# Patient Record
Sex: FEMALE
[De-identification: names, ages, dates, MRNs, and addresses within clinical notes are randomized per-mention and may not be internally consistent; named-entity substitution may affect disease eponyms.]

---

## 2024-02-28 ENCOUNTER — NON-APPOINTMENT (OUTPATIENT)
Age: 54
End: 2024-02-28

## 2024-02-29 ENCOUNTER — NON-APPOINTMENT (OUTPATIENT)
Age: 54
End: 2024-02-29

## 2024-02-29 PROBLEM — Z00.00 ENCOUNTER FOR PREVENTIVE HEALTH EXAMINATION: Status: ACTIVE | Noted: 2024-02-29

## 2024-03-07 ENCOUNTER — NON-APPOINTMENT (OUTPATIENT)
Age: 54
End: 2024-03-07

## 2024-09-30 ENCOUNTER — NON-APPOINTMENT (OUTPATIENT)
Age: 54
End: 2024-09-30

## 2024-10-01 ENCOUNTER — APPOINTMENT (OUTPATIENT)
Dept: THORACIC SURGERY | Facility: CLINIC | Age: 54
End: 2024-10-01
Payer: COMMERCIAL

## 2024-10-01 VITALS — HEIGHT: 63 IN | BODY MASS INDEX: 31.54 KG/M2 | WEIGHT: 178 LBS

## 2024-10-01 DIAGNOSIS — Z87.891 PERSONAL HISTORY OF NICOTINE DEPENDENCE: ICD-10-CM

## 2024-10-01 PROCEDURE — ACP01: CPT | Mod: NC

## 2024-10-01 NOTE — HISTORY OF PRESENT ILLNESS
[Former] : Former [TextBox_13] : Referred by Dr. Dayday MEMBRENO had telephonic visit for a review of eligibility and discussion of the Low dose CT lung cancer screening program. A telephonic visit occurred due to the patient not having access to a smart phone or a computer for an audio/visual visit. The following was reviewed and confirmed the patient meets screening eligibility criteria. -Age 54 year Smoking Status: -Former smoker Smokes E-Cigarettes with nicotine Started smoking at   12  years old. Smoked occasionally for 2 years, then smoked 1 PPD for 35 years. -Number of pack years smokin -Number of years since quitting smokin -Quit year: 2019   Ms. MEMBRENO denies any signs or symptoms of lung cancer including new cough, changing cough, hemoptysis, and unintentional weight loss.   Ms. MEMBRENO spinal cages in cervical spine.  denies HX of lung disease, heart disease, connective tissue disease, and any personal history of cancer. Reports no lung cancer in a 1st degree relative. Reports no lung cancer in a 2nd degree relative. Had exposure to 2nd hand smoke. Denies any history of occupational exposures.    [YearQuit] : 2019 [PacksperYear] : 35

## 2024-10-01 NOTE — PLAN
[Smoking Cessation] : smoking cessation [FreeTextEntry1] : Plan:  -Low dose CT chest for lung cancer screening. DARA HOLLIDAY ordered the low dose CT.        -Follow up with her referring provider after her LDCT results have been reviewed by the multidisciplinary clinical team, if needed.    -Encourage continued smoking abstinence.    -Encouraged vape cessation.    -Patient declines referral to [facility] Quits and [facility]    Should I screen? tool utilized. 6 Year risk of lung cancer is   0.7%.    Patient wishes to proceed with screening.   Engaged in discussion regarding risks of screening during Covid-19 pandemic and precautions that are being used  to reduce exposure.   Engaged in shared decision making with MsTammy HASEEB . Answered all questions. She verbalized understanding and agreement. She knows to call back with and questions or concerns.

## 2024-10-01 NOTE — ASSESSMENT
[Not Ready] : Patient is not ready for cessation intervention [Pre-contemplation] : Pre-contemplation: The patient is not thinking about quitting smoking in the next 6 months [de-identified] : Acknowledged how difficult it was to quit smoking. Advised that quitting smoking is the most important thing a person can do for their health. She is not ready to quit e-cigarettes at this time.  She agrees to call writer at any point in the future he wishes to discuss smoking cessation programs with [facility].

## 2024-10-03 ENCOUNTER — TRANSCRIPTION ENCOUNTER (OUTPATIENT)
Age: 54
End: 2024-10-03

## 2025-06-17 ENCOUNTER — APPOINTMENT (OUTPATIENT)
Facility: CLINIC | Age: 55
End: 2025-06-17
Payer: COMMERCIAL

## 2025-06-17 VITALS
HEART RATE: 76 BPM | HEIGHT: 63 IN | BODY MASS INDEX: 30.28 KG/M2 | SYSTOLIC BLOOD PRESSURE: 120 MMHG | WEIGHT: 170.9 LBS | DIASTOLIC BLOOD PRESSURE: 70 MMHG

## 2025-06-17 PROCEDURE — 99205 OFFICE O/P NEW HI 60 MIN: CPT

## 2025-06-17 RX ORDER — MONTELUKAST SODIUM 10 MG/1
10 TABLET, FILM COATED ORAL
Refills: 0 | Status: ACTIVE | COMMUNITY
Start: 2025-06-17

## 2025-06-17 RX ORDER — BUPROPION HYDROCHLORIDE 150 MG/1
150 TABLET, FILM COATED ORAL DAILY
Refills: 0 | Status: ACTIVE | COMMUNITY
Start: 2025-06-17

## 2025-06-17 RX ORDER — VILAZODONE HYDROCHLORIDE 20 MG/1
20 TABLET ORAL
Qty: 30 | Refills: 0 | Status: ACTIVE | COMMUNITY
Start: 2025-06-17

## 2025-06-17 RX ORDER — NALTREXONE HYDROCHLORIDE 50 MG/1
50 TABLET, FILM COATED ORAL DAILY
Qty: 30 | Refills: 0 | Status: ACTIVE | COMMUNITY
Start: 2025-06-17 | End: 1900-01-01

## 2025-07-08 ENCOUNTER — NON-APPOINTMENT (OUTPATIENT)
Age: 55
End: 2025-07-08

## 2025-07-08 PROBLEM — F32.A DEPRESSION: Status: ACTIVE | Noted: 2025-06-17

## 2025-07-09 ENCOUNTER — NON-APPOINTMENT (OUTPATIENT)
Age: 55
End: 2025-07-09

## 2025-07-09 ENCOUNTER — APPOINTMENT (OUTPATIENT)
Dept: CARDIOLOGY | Facility: CLINIC | Age: 55
End: 2025-07-09
Payer: COMMERCIAL

## 2025-07-09 VITALS
WEIGHT: 169 LBS | DIASTOLIC BLOOD PRESSURE: 70 MMHG | HEIGHT: 63 IN | OXYGEN SATURATION: 97 % | SYSTOLIC BLOOD PRESSURE: 118 MMHG | BODY MASS INDEX: 29.95 KG/M2 | HEART RATE: 77 BPM

## 2025-07-09 PROBLEM — Z78.9 SOCIAL ALCOHOL USE: Status: ACTIVE | Noted: 2025-07-09

## 2025-07-09 PROBLEM — R06.02 SHORTNESS OF BREATH ON EXERTION: Status: ACTIVE | Noted: 2025-07-09

## 2025-07-09 PROBLEM — Z87.891 FORMER SMOKER: Status: ACTIVE | Noted: 2025-07-09

## 2025-07-09 PROBLEM — Z78.9 NO FAMILY HISTORY OF DIABETES MELLITUS: Status: ACTIVE | Noted: 2025-07-09

## 2025-07-09 PROBLEM — Z78.9 CAFFEINE USE: Status: ACTIVE | Noted: 2025-07-09

## 2025-07-09 PROBLEM — Z80.9 FAMILY HISTORY OF MALIGNANT NEOPLASM: Status: ACTIVE | Noted: 2025-07-09

## 2025-07-09 PROBLEM — R07.9 CHEST PAIN AT REST: Status: ACTIVE | Noted: 2025-07-09

## 2025-07-09 PROBLEM — Z84.89 PATIENT'S FATHER IS DECEASED: Status: ACTIVE | Noted: 2025-07-09

## 2025-07-09 PROBLEM — R73.03 PREDIABETES: Status: ACTIVE | Noted: 2025-07-09

## 2025-07-09 PROCEDURE — 99205 OFFICE O/P NEW HI 60 MIN: CPT | Mod: 25

## 2025-07-09 PROCEDURE — 93000 ELECTROCARDIOGRAM COMPLETE: CPT

## 2025-07-09 RX ORDER — ATORVASTATIN CALCIUM 10 MG/1
10 TABLET, FILM COATED ORAL
Refills: 0 | Status: ACTIVE | COMMUNITY

## 2025-07-22 ENCOUNTER — APPOINTMENT (OUTPATIENT)
Facility: CLINIC | Age: 55
End: 2025-07-22
Payer: COMMERCIAL

## 2025-07-22 VITALS
BODY MASS INDEX: 29.77 KG/M2 | HEART RATE: 78 BPM | DIASTOLIC BLOOD PRESSURE: 70 MMHG | HEIGHT: 63 IN | SYSTOLIC BLOOD PRESSURE: 120 MMHG | WEIGHT: 168 LBS

## 2025-07-22 DIAGNOSIS — E66.811 OBESITY, CLASS 1: ICD-10-CM

## 2025-07-22 DIAGNOSIS — R06.83 SNORING: ICD-10-CM

## 2025-07-22 DIAGNOSIS — I25.10 ATHEROSCLEROTIC HEART DISEASE OF NATIVE CORONARY ARTERY W/OUT ANGINA PECTORIS: ICD-10-CM

## 2025-07-22 DIAGNOSIS — E78.49 OTHER HYPERLIPIDEMIA: ICD-10-CM

## 2025-07-22 PROCEDURE — 99214 OFFICE O/P EST MOD 30 MIN: CPT

## 2025-07-22 RX ORDER — NALTREXONE HYDROCHLORIDE 50 MG/1
50 TABLET, FILM COATED ORAL TWICE DAILY
Qty: 60 | Refills: 5 | Status: ACTIVE | COMMUNITY
Start: 2025-07-22 | End: 1900-01-01

## 2025-07-22 RX ORDER — TIRZEPATIDE 5 MG/.5ML
5 INJECTION, SOLUTION SUBCUTANEOUS
Qty: 4 | Refills: 0 | Status: ACTIVE | COMMUNITY
Start: 2025-07-22 | End: 1900-01-01

## 2025-07-24 ENCOUNTER — APPOINTMENT (OUTPATIENT)
Dept: CARDIOLOGY | Facility: CLINIC | Age: 55
End: 2025-07-24
Payer: COMMERCIAL

## 2025-07-24 PROCEDURE — 36415 COLL VENOUS BLD VENIPUNCTURE: CPT

## 2025-07-25 LAB
ALBUMIN SERPL ELPH-MCNC: 3.9 G/DL
ALP BLD-CCNC: 78 U/L
ALT SERPL-CCNC: 34 U/L
ANION GAP SERPL CALC-SCNC: 13 MMOL/L
APTT BLD: 32.6 SEC
AST SERPL-CCNC: 20 U/L
BILIRUB SERPL-MCNC: 0.4 MG/DL
BUN SERPL-MCNC: 15 MG/DL
CALCIUM SERPL-MCNC: 9.3 MG/DL
CHLORIDE SERPL-SCNC: 108 MMOL/L
CHOLEST SERPL-MCNC: 168 MG/DL
CO2 SERPL-SCNC: 24 MMOL/L
CREAT SERPL-MCNC: 0.78 MG/DL
EGFRCR SERPLBLD CKD-EPI 2021: 89 ML/MIN/1.73M2
ESTIMATED AVERAGE GLUCOSE: 117 MG/DL
GLUCOSE SERPL-MCNC: 90 MG/DL
HBA1C MFR BLD HPLC: 5.7 %
HDLC SERPL-MCNC: 78 MG/DL
INR PPP: 0.9 RATIO
LDLC SERPL-MCNC: 75 MG/DL
NONHDLC SERPL-MCNC: 91 MG/DL
POTASSIUM SERPL-SCNC: 4.6 MMOL/L
PROT SERPL-MCNC: 6.1 G/DL
PT BLD: 10.6 SEC
SODIUM SERPL-SCNC: 145 MMOL/L
TRIGL SERPL-MCNC: 82 MG/DL

## 2025-07-26 ENCOUNTER — NON-APPOINTMENT (OUTPATIENT)
Age: 55
End: 2025-07-26

## 2025-07-28 ENCOUNTER — APPOINTMENT (OUTPATIENT)
Dept: CARDIOLOGY | Facility: CLINIC | Age: 55
End: 2025-07-28
Payer: COMMERCIAL

## 2025-07-28 ENCOUNTER — TRANSCRIPTION ENCOUNTER (OUTPATIENT)
Age: 55
End: 2025-07-28

## 2025-07-28 PROCEDURE — 93306 TTE W/DOPPLER COMPLETE: CPT

## 2025-07-29 ENCOUNTER — NON-APPOINTMENT (OUTPATIENT)
Age: 55
End: 2025-07-29

## 2025-07-29 ENCOUNTER — RESULT REVIEW (OUTPATIENT)
Age: 55
End: 2025-07-29

## 2025-09-04 ENCOUNTER — APPOINTMENT (OUTPATIENT)
Dept: BARIATRICS/WEIGHT MGMT | Facility: CLINIC | Age: 55
End: 2025-09-04
Payer: COMMERCIAL

## 2025-09-04 DIAGNOSIS — I25.10 ATHEROSCLEROTIC HEART DISEASE OF NATIVE CORONARY ARTERY W/OUT ANGINA PECTORIS: ICD-10-CM

## 2025-09-04 DIAGNOSIS — R73.03 PREDIABETES.: ICD-10-CM

## 2025-09-04 DIAGNOSIS — E66.811 OBESITY, CLASS 1: ICD-10-CM

## 2025-09-04 PROCEDURE — 99213 OFFICE O/P EST LOW 20 MIN: CPT | Mod: 95

## 2025-09-04 RX ORDER — PHENTERMINE HYDROCHLORIDE 15 MG/1
15 CAPSULE ORAL DAILY
Qty: 30 | Refills: 1 | Status: ACTIVE | COMMUNITY
Start: 2025-09-04 | End: 1900-01-01